# Patient Record
Sex: MALE | Race: WHITE | NOT HISPANIC OR LATINO | URBAN - METROPOLITAN AREA
[De-identification: names, ages, dates, MRNs, and addresses within clinical notes are randomized per-mention and may not be internally consistent; named-entity substitution may affect disease eponyms.]

---

## 2023-09-29 ENCOUNTER — OFFICE VISIT (OUTPATIENT)
Dept: URGENT CARE | Facility: URGENT CARE | Age: 43
End: 2023-09-29

## 2023-09-29 VITALS
WEIGHT: 164 LBS | OXYGEN SATURATION: 99 % | DIASTOLIC BLOOD PRESSURE: 75 MMHG | RESPIRATION RATE: 16 BRPM | TEMPERATURE: 98.2 F | HEART RATE: 65 BPM | SYSTOLIC BLOOD PRESSURE: 118 MMHG

## 2023-09-29 DIAGNOSIS — S39.012A LUMBOSACRAL STRAIN, INITIAL ENCOUNTER: ICD-10-CM

## 2023-09-29 DIAGNOSIS — S16.1XXA STRAIN OF NECK MUSCLE, INITIAL ENCOUNTER: Primary | ICD-10-CM

## 2023-09-29 PROCEDURE — 99203 OFFICE O/P NEW LOW 30 MIN: CPT | Performed by: PHYSICIAN ASSISTANT

## 2023-09-29 RX ORDER — METAXALONE 800 MG/1
800 TABLET ORAL 3 TIMES DAILY
Qty: 30 TABLET | Refills: 0 | Status: SHIPPED | OUTPATIENT
Start: 2023-09-29

## 2023-09-29 RX ORDER — IBUPROFEN 800 MG/1
800 TABLET, FILM COATED ORAL EVERY 8 HOURS PRN
Qty: 30 TABLET | Refills: 0 | Status: SHIPPED | OUTPATIENT
Start: 2023-09-29

## 2023-09-29 ASSESSMENT — ENCOUNTER SYMPTOMS
NUMBNESS: 0
HEADACHES: 0
NECK PAIN: 1
CHILLS: 0
BACK PAIN: 1
WEAKNESS: 0
SHORTNESS OF BREATH: 0
FEVER: 0

## 2023-09-29 NOTE — PROGRESS NOTES
SUBJECTIVE:   Romel Ojeda is a 43 year old male presenting with a chief complaint of   Chief Complaint   Patient presents with    Urgent Care    Musculoskeletal Problem     Per patient states last week he had pain from his low back that radiates up to neck, only right side, no injuries, has not taken or done anything for symptoms      Right-sided: lower back, up into neck. Tingling of posterior right arm,  not constant  Since last week, no injury or incident.  Has had back pain for a long time but now flaring  Not limiting him, came in today 'sick of the pain', 8/10. Is improving from last week slightly  Worse when trying to lift things  Tried: Tylenol helped, icing    He is an established patient of [facility].      Review of Systems   Constitutional:  Negative for chills and fever.   Respiratory:  Negative for shortness of breath.    Cardiovascular:  Negative for chest pain.   Musculoskeletal:  Positive for back pain and neck pain.   Neurological:  Negative for weakness, numbness and headaches.       Past Medical History:   Diagnosis Date    Unspecified delay in development(315.9)     Development delay     Family History   Problem Relation Age of Onset    Thyroid Disease Sister     Thyroid Disease Brother      Current Outpatient Medications   Medication Sig Dispense Refill    ibuprofen (ADVIL/MOTRIN) 800 MG tablet Take 1 tablet (800 mg) by mouth every 8 hours as needed for moderate pain 30 tablet 0    metaxalone (SKELAXIN) 800 MG tablet Take 1 tablet (800 mg) by mouth 3 times daily 30 tablet 0     Social History     Tobacco Use    Smoking status: Every Day    Smokeless tobacco: Never    Tobacco comments:     chew   Substance Use Topics    Alcohol use: Not on file       OBJECTIVE  /75   Pulse 65   Temp 98.2  F (36.8  C) (Tympanic)   Resp 16   Wt 74.4 kg (164 lb)   SpO2 99%     Physical Exam  Vitals and nursing note reviewed.   Constitutional:       General: He is not in acute distress.      Appearance: Normal appearance. He is normal weight. He is not ill-appearing, toxic-appearing or diaphoretic.   HENT:      Head: Normocephalic and atraumatic.   Eyes:      Conjunctiva/sclera: Conjunctivae normal.   Cardiovascular:      Rate and Rhythm: Normal rate.   Pulmonary:      Effort: Pulmonary effort is normal.   Musculoskeletal:      Cervical back: Normal range of motion.      Comments: Tender to palpation of right trapezius, right paraspinal muscle, and right lumbosacral region  ROM normal  5/5 strength upper and lower extremities  Sensation normal upper extremities   Skin:     General: Skin is warm and dry.   Neurological:      General: No focal deficit present.      Mental Status: He is alert and oriented to person, place, and time. Mental status is at baseline.   Psychiatric:         Mood and Affect: Mood normal.         Behavior: Behavior normal.         Thought Content: Thought content normal.         Judgment: Judgment normal.         Labs:  No results found for this or any previous visit (from the past 24 hour(s)).    X-Ray was not done.    ASSESSMENT:      ICD-10-CM    1. Strain of neck muscle, initial encounter  S16.1XXA Physical Therapy Referral     ibuprofen (ADVIL/MOTRIN) 800 MG tablet     metaxalone (SKELAXIN) 800 MG tablet      2. Lumbosacral strain, initial encounter  S39.012A Physical Therapy Referral     ibuprofen (ADVIL/MOTRIN) 800 MG tablet     metaxalone (SKELAXIN) 800 MG tablet           Medical Decision Making:    Differential Diagnosis:  MS Injury Pain: muscle strain and osteoarthritis  Back Pain: myofascial low back strain, lumbosacral strain, acute sciatica, and chronic low back pain    Serious Comorbid Conditions:  Adult:  None    PLAN:    Muscle strain, prescribed 800mg ibuprofen and Skelaxin. Made PT referral. Recommended Tylenol, rest, and icing. Discussed red flags with patient and recommended to seek medical attention if experiencing these. Encouraged to follow up with PCP in a  month if no improvement    Followup:    If not improving or if condition worsens, follow up with your Primary Care Provider in 1 month    There are no Patient Instructions on file for this visit.    N[facility] Student

## 2023-10-02 ENCOUNTER — TELEPHONE (OUTPATIENT)
Dept: URGENT CARE | Facility: URGENT CARE | Age: 43
End: 2023-10-02

## 2023-10-02 NOTE — TELEPHONE ENCOUNTER
Fax message:   metaxalone (SKELAXIN) 800 MG tablet   Is not covered on insurance. Patient requests change to another option. Thanks.

## 2024-07-30 ENCOUNTER — TELEPHONE (OUTPATIENT)
Dept: PSYCHOLOGY | Facility: CLINIC | Age: 44
End: 2024-07-30